# Patient Record
Sex: FEMALE | Race: BLACK OR AFRICAN AMERICAN
[De-identification: names, ages, dates, MRNs, and addresses within clinical notes are randomized per-mention and may not be internally consistent; named-entity substitution may affect disease eponyms.]

---

## 2017-09-26 ENCOUNTER — HOSPITAL ENCOUNTER (OUTPATIENT)
Dept: HOSPITAL 74 - FASU | Age: 72
Discharge: HOME | End: 2017-09-26
Payer: COMMERCIAL

## 2017-09-26 VITALS — TEMPERATURE: 98.6 F

## 2017-09-26 VITALS — HEART RATE: 52 BPM | DIASTOLIC BLOOD PRESSURE: 55 MMHG | SYSTOLIC BLOOD PRESSURE: 138 MMHG

## 2017-09-26 VITALS — BODY MASS INDEX: 23.6 KG/M2

## 2017-09-26 DIAGNOSIS — H26.8: Primary | ICD-10-CM

## 2017-09-26 PROCEDURE — 08RK3JZ REPLACEMENT OF LEFT LENS WITH SYNTHETIC SUBSTITUTE, PERCUTANEOUS APPROACH: ICD-10-PCS

## 2017-09-26 RX ADMIN — GENTAMICIN SULFATE ONE DROP: 3 SOLUTION/ DROPS OPHTHALMIC at 12:30

## 2017-09-26 RX ADMIN — CYCLOPENTOLATE HYDROCHLORIDE ONE DROP: 10 SOLUTION/ DROPS OPHTHALMIC at 13:00

## 2017-09-26 RX ADMIN — PHENYLEPHRINE HYDROCHLORIDE ONE DROP: 0.25 SPRAY NASAL at 12:40

## 2017-09-26 RX ADMIN — PHENYLEPHRINE HYDROCHLORIDE ONE DROP: 0.25 SPRAY NASAL at 12:50

## 2017-09-26 RX ADMIN — TROPICAMIDE ONE DROP: 10 SOLUTION/ DROPS OPHTHALMIC at 12:40

## 2017-09-26 RX ADMIN — GENTAMICIN SULFATE ONE DROP: 3 SOLUTION/ DROPS OPHTHALMIC at 12:50

## 2017-09-26 RX ADMIN — CYCLOPENTOLATE HYDROCHLORIDE ONE DROP: 10 SOLUTION/ DROPS OPHTHALMIC at 12:30

## 2017-09-26 RX ADMIN — TROPICAMIDE ONE DROP: 10 SOLUTION/ DROPS OPHTHALMIC at 12:30

## 2017-09-26 RX ADMIN — PHENYLEPHRINE HYDROCHLORIDE ONE DROP: 0.25 SPRAY NASAL at 13:00

## 2017-09-26 RX ADMIN — TROPICAMIDE ONE DROP: 10 SOLUTION/ DROPS OPHTHALMIC at 13:10

## 2017-09-26 RX ADMIN — GENTAMICIN SULFATE ONE DROP: 3 SOLUTION/ DROPS OPHTHALMIC at 13:10

## 2017-09-26 RX ADMIN — CYCLOPENTOLATE HYDROCHLORIDE ONE DROP: 10 SOLUTION/ DROPS OPHTHALMIC at 13:10

## 2017-09-26 RX ADMIN — TROPICAMIDE ONE DROP: 10 SOLUTION/ DROPS OPHTHALMIC at 12:50

## 2017-09-26 RX ADMIN — CYCLOPENTOLATE HYDROCHLORIDE ONE DROP: 10 SOLUTION/ DROPS OPHTHALMIC at 12:40

## 2017-09-26 RX ADMIN — TROPICAMIDE ONE DROP: 10 SOLUTION/ DROPS OPHTHALMIC at 13:00

## 2017-09-26 RX ADMIN — GENTAMICIN SULFATE ONE DROP: 3 SOLUTION/ DROPS OPHTHALMIC at 13:00

## 2017-09-26 RX ADMIN — CYCLOPENTOLATE HYDROCHLORIDE ONE DROP: 10 SOLUTION/ DROPS OPHTHALMIC at 12:50

## 2017-09-26 RX ADMIN — PHENYLEPHRINE HYDROCHLORIDE ONE DROP: 0.25 SPRAY NASAL at 13:10

## 2017-09-26 RX ADMIN — PHENYLEPHRINE HYDROCHLORIDE ONE DROP: 0.25 SPRAY NASAL at 12:30

## 2017-09-26 RX ADMIN — GENTAMICIN SULFATE ONE DROP: 3 SOLUTION/ DROPS OPHTHALMIC at 12:40

## 2017-09-27 NOTE — OP
DATE OF OPERATION:  09/26/2017

 

TYPE OF PROCEDURES PLANNED:  Extracapsular cataract extraction, phacoemulsification,

insertion of posterior chamber lens implant, left eye.

 

SURGEON:  Jovani Estrada MD

 

ASSISTANT SURGEON:  Jovani Estrada MD

 

ANESTHESIA:  Local with standby.

 

ANESTHESIOLOGIST:  Marvel Beckett MD

 

COMPLICATIONS:  None.

 

PREOPERATIVE DIAGNOSIS:  Cataract, left eye.

 

POSTOPERATIVE DIAGNOSIS:  Cataract, left eye.

 

FINDINGS AND PROCEDURE:  After successful peribulbar anesthesia was given to the left

eye, the patient was prepped and draped in the usual manner to expose the left eye. 

A lid speculum was inserted and the microscope brought into position over the eye.

 

A superior fornix-based flap was then fashioned for 12 mm using Dash scissors and

0.12 forceps and hemostasis achieved with Wet-field cautery.  A limbal groove was

then fashioned for 3 mm with a crescent blade, dissecting anteriorly into clear

cornea, and then a 3-mm blade was used to enter the anterior chamber.  Under Viscoat

360 degrees, anterior capsulotomy was performed and _____ removed from the eye and

phacoemulsification of the entire nucleus was then done approximately 2 minutes'

time, followed by irrigation and aspiration of all cortical material, leaving an

intact posterior capsule and a red reflex present.  Provisc was injected into the

posterior chamber to deepen the posterior capsule then the implant was inspected

carefully.  Found to be free of defects or being flawed, it was folded and placed in

the Provisc-filled cartridge.  The cartridge was placed in the injector and the

implant was injected into the eye such that the inferior haptic was in the inferior

capsular bag and the superior haptic was in the superior capsular bag and maintained

in the horizontal position with a Sinskey hook.  It should be noted that there was a

button hole on the crescent, 3-mm incision and it was necessary to put in 2 sutures

to close the wound down to make it aqueous-tight.  It should be noted that the

Provisc was aspirated out and replaced with Miochol, Miostat and BSS.  The wound was

closed with 2 interrupted 2-0 Ethibond sutures because of the button hole as

mentioned before.  Conjunctival tenon flap was then reapproximated and at this point

the implant was fixated in the capsular bag, centrally located with a round pupil,

intact posterior capsule and a red reflex present.  Topical Betoptic, as well as

Maxitrol ophthalmic as mentioned was placed, as was bacitracin, erythromycin

ophthalmic ointment, and the speculum and the Tegaderm strips were removed from the

lashes.  The lids were closed and a patch and shield placed on the eye.

 

The patient was then discharged from the operating room to the recovery area in good

condition, having tolerated the procedure well.

 

 

HAYLEY [id number]

DD: 09/26/2017 14:40

DT: 09/27/2017 09:18

Job #:  [id number]

## 2022-04-06 ENCOUNTER — APPOINTMENT (OUTPATIENT)
Dept: GASTROENTEROLOGY | Facility: CLINIC | Age: 77
End: 2022-04-06
Payer: MEDICARE

## 2022-04-06 VITALS
WEIGHT: 145 LBS | HEIGHT: 64.5 IN | HEART RATE: 59 BPM | BODY MASS INDEX: 24.45 KG/M2 | TEMPERATURE: 97.5 F | SYSTOLIC BLOOD PRESSURE: 134 MMHG | DIASTOLIC BLOOD PRESSURE: 62 MMHG

## 2022-04-06 DIAGNOSIS — Z87.891 PERSONAL HISTORY OF NICOTINE DEPENDENCE: ICD-10-CM

## 2022-04-06 DIAGNOSIS — K62.5 HEMORRHAGE OF ANUS AND RECTUM: ICD-10-CM

## 2022-04-06 DIAGNOSIS — Z83.3 FAMILY HISTORY OF DIABETES MELLITUS: ICD-10-CM

## 2022-04-06 PROBLEM — Z00.00 ENCOUNTER FOR PREVENTIVE HEALTH EXAMINATION: Status: ACTIVE | Noted: 2022-04-06

## 2022-04-06 PROCEDURE — 99203 OFFICE O/P NEW LOW 30 MIN: CPT

## 2022-04-06 RX ORDER — ATORVASTATIN CALCIUM 40 MG/1
40 TABLET, FILM COATED ORAL
Refills: 0 | Status: ACTIVE | COMMUNITY

## 2022-04-06 RX ORDER — LISINOPRIL 10 MG/1
10 TABLET ORAL
Refills: 0 | Status: ACTIVE | COMMUNITY

## 2022-04-06 NOTE — CONSULT LETTER
[FreeTextEntry1] : Dear Dr. CHU FAULKNER ,\par \par I had the pleasure of evaluating your patient,  MAL EDUARDO.\par \par Please refer to my note below.\par \par Thank you very much for allowing me to participate in the care of this patient.  If you have any questions, please do not hesitate to contact me.\par \par Sincerely, \par \par Jonathan Huber MD\par

## 2022-04-06 NOTE — REASON FOR VISIT
[Consultation] : a consultation visit [FreeTextEntry1] : Kindly asked by CHU FAULKNER MD  to consult and evaluate patient for rectal bleeding                    \par A copy of this note is being sent to physician requesting consultation.

## 2022-04-06 NOTE — HISTORY OF PRESENT ILLNESS
[FreeTextEntry1] : 76f HTN, HLD, here for rectal bleeding - has had painless BRBPR for 6 wks w/ otherwise normal BMs.  No abd pain, wt loss, diarrhea, constipation. She was treated for hemorrhoids by PMD and sx persist.  Had cologuard - reportedly negative in 1/2020.  No prior colonoscopy.  \par \par Soc:  no tobacco or significant EtOH\par FHx: no FHx GI malignancy or IBD\par \par ROS:\par Constitutional:: no weight loss, fevers\par ENT: no deafness\par Eyes: not blind\par Neck: no LN\par Chest: no dyspnea/cough\par Cardiac: no chest pain\par Vascular: no leg swelling\par GI: no abdominal pain, nausea, vomiting, diarrhea, constipation, rectal bleeding, dysphagia, melena unless otherwise noted in HPI\par : no dysuria, dark urine\par Skin: no rashes, jaundice\par Heme: no bleeding\par Endocrine: no DM unless otherwise stated in HPI\par \par Px: (VS noted below)\par General: NAD\par Eyes: anicteric\par Oropharynx:  clear\par Neck: no LN\par Chest: normal respiratory effort\par CVS: regular\par Abd: soft, NT, ND, +BS, no HSM\par Ext: no atrophy\par Neuro: grossly nonfocal\par \par Labs/imaging/prior endoscopic results reviewed to the extent available and noted in HPI\par

## 2022-04-06 NOTE — ASSESSMENT
[FreeTextEntry1] : - possble internal hemorrhoids but will need to r/o neoplasm - Colonoscopy scheduled - Risks, benefits, alternatives were discussed, including but not limited to bleeding, infection, perforation and sedation risks. Additionally, the possibility of missed lesions was conveyed.\par \par - in case of internal hemorrhoid, reviewed dietary and lifestyle modifications, including increased fluid, fiber intake, as well as habituation / following urge to defecate, cutting back on bread/pasta, and to consider starting fiber supplement (eg.,  psyllium)\par \par \par PMD/consultation/hospital notes and Labs/imaging/prior endoscopic results reviewed to extent noted in HPI; and, if procedure code billed on this visit for lab draw, this serves to signify that labs were drawn here in this office.\par

## 2022-04-16 ENCOUNTER — RESULT REVIEW (OUTPATIENT)
Age: 77
End: 2022-04-16

## 2022-04-18 ENCOUNTER — RESULT REVIEW (OUTPATIENT)
Age: 77
End: 2022-04-18

## 2022-04-19 ENCOUNTER — APPOINTMENT (OUTPATIENT)
Dept: GASTROENTEROLOGY | Facility: HOSPITAL | Age: 77
End: 2022-04-19

## 2022-04-19 DIAGNOSIS — K51.211 ULCERATIVE (CHRONIC) PROCTITIS WITH RECTAL BLEEDING: ICD-10-CM

## 2022-04-19 RX ORDER — MESALAMINE 1000 MG/1
1000 SUPPOSITORY RECTAL
Qty: 30 | Refills: 1 | Status: ACTIVE | COMMUNITY
Start: 2022-04-19 | End: 1900-01-01